# Patient Record
Sex: FEMALE | Race: WHITE | NOT HISPANIC OR LATINO
[De-identification: names, ages, dates, MRNs, and addresses within clinical notes are randomized per-mention and may not be internally consistent; named-entity substitution may affect disease eponyms.]

---

## 2020-05-20 ENCOUNTER — APPOINTMENT (OUTPATIENT)
Dept: ORTHOPEDIC SURGERY | Facility: CLINIC | Age: 55
End: 2020-05-20
Payer: COMMERCIAL

## 2020-05-20 VITALS
SYSTOLIC BLOOD PRESSURE: 134 MMHG | BODY MASS INDEX: 28.68 KG/M2 | DIASTOLIC BLOOD PRESSURE: 81 MMHG | TEMPERATURE: 98 F | HEART RATE: 108 BPM | WEIGHT: 168 LBS | HEIGHT: 64 IN

## 2020-05-20 DIAGNOSIS — M79.602 PAIN IN LEFT ARM: ICD-10-CM

## 2020-05-20 PROCEDURE — 73030 X-RAY EXAM OF SHOULDER: CPT | Mod: LT

## 2020-05-20 PROCEDURE — 99204 OFFICE O/P NEW MOD 45 MIN: CPT

## 2020-05-20 RX ORDER — PREDNISONE 50 MG/1
50 TABLET ORAL DAILY
Qty: 5 | Refills: 0 | Status: ACTIVE | COMMUNITY
Start: 2020-05-20 | End: 1900-01-01

## 2020-05-23 NOTE — HISTORY OF PRESENT ILLNESS
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  Pain is primarily located at the , anterior shoulder and lateral upper arm. It began in  beginning of 3/2020 without injury or trauma.  Pain is described as sharp/achy/pulling in nature, 6/10 in intensity, worse with increased range of motion, better with rest.  \par No Bruising/swelling\par NO prior injury\par Denies bowel/bladder changes, fevers, chills, saddle anesthesia. \par Reports having tingling on bilateral hands from her knuckles to her finger tips. This began after a recent hysterectomy.  \par \par

## 2020-05-23 NOTE — DISCUSSION/SUMMARY
[de-identified] : Ilda and I discussed her shoulder pain.  She has a very large calcific density overlying the biceps tendon, which is likely the main source of her pain.    I suspect that much of her arm pain originates from the inflammation around this area.  I provided her with a short course of prednisone to help decrease the acute inflammation around this density.  She can follow-up next week for a ultrasound-guided barbotage of the area.\par prednisone.\par \par Nneka Hicks MD, EdM\par Sports Medicine PM&R\par \par \par I, Marija Hagan ATC, assisted with the history and documentation for Dr. Hicks on this date 05/20/2020\par \par

## 2020-05-27 ENCOUNTER — APPOINTMENT (OUTPATIENT)
Dept: ORTHOPEDIC SURGERY | Facility: CLINIC | Age: 55
End: 2020-05-27
Payer: COMMERCIAL

## 2020-05-27 VITALS
HEART RATE: 96 BPM | HEIGHT: 64 IN | SYSTOLIC BLOOD PRESSURE: 115 MMHG | WEIGHT: 168 LBS | DIASTOLIC BLOOD PRESSURE: 77 MMHG | BODY MASS INDEX: 28.68 KG/M2 | TEMPERATURE: 98.9 F

## 2020-05-27 PROCEDURE — 99214 OFFICE O/P EST MOD 30 MIN: CPT | Mod: 25

## 2020-05-27 PROCEDURE — 76942 ECHO GUIDE FOR BIOPSY: CPT | Mod: LT

## 2020-05-27 PROCEDURE — 20551 NJX 1 TENDON ORIGIN/INSJ: CPT | Mod: LT

## 2020-05-27 RX ORDER — PREDNISONE 50 MG/1
50 TABLET ORAL DAILY
Qty: 5 | Refills: 0 | Status: ACTIVE | COMMUNITY
Start: 2020-05-27 | End: 1900-01-01

## 2020-05-27 RX ORDER — OXYCODONE 5 MG/1
5 TABLET ORAL
Qty: 20 | Refills: 0 | Status: ACTIVE | COMMUNITY
Start: 2020-05-27 | End: 1900-01-01

## 2020-06-08 ENCOUNTER — APPOINTMENT (OUTPATIENT)
Dept: MRI IMAGING | Facility: IMAGING CENTER | Age: 55
End: 2020-06-08
Payer: COMMERCIAL

## 2020-06-08 ENCOUNTER — OUTPATIENT (OUTPATIENT)
Dept: OUTPATIENT SERVICES | Facility: HOSPITAL | Age: 55
LOS: 1 days | End: 2020-06-08
Payer: COMMERCIAL

## 2020-06-08 DIAGNOSIS — M79.602 PAIN IN LEFT ARM: ICD-10-CM

## 2020-06-08 PROCEDURE — 73221 MRI JOINT UPR EXTREM W/O DYE: CPT | Mod: 26,LT

## 2020-06-08 PROCEDURE — 73221 MRI JOINT UPR EXTREM W/O DYE: CPT

## 2020-06-12 ENCOUNTER — APPOINTMENT (OUTPATIENT)
Dept: ORTHOPEDIC SURGERY | Facility: CLINIC | Age: 55
End: 2020-06-12
Payer: COMMERCIAL

## 2020-06-12 PROCEDURE — 99213 OFFICE O/P EST LOW 20 MIN: CPT

## 2020-06-13 NOTE — PHYSICAL EXAM
[de-identified] : Constitutional: Well-nourished, well-developed, No acute distress\par Respiratory: Good respiratory effort, no SOB\par Lymphatic: No regional lymphadenopathy, no lymphedema\par Psychiatric: Pleasant and normal affect, alert and oriented x3\par Skin: Clean dry and intact B/L UE/LE\par Musculoskeletal: normal except where as noted in regional exam\par \par left Shoulder:\par APPEARANCE: no marked deformities, no swelling or malalignment\par POSITIVE TENDERNES LH biceps, \par NON TENDERNESS: S: supraspinatus, infraspinatus, teres minor, anterior and posterior capsule, \par ROM: limited in all directions due to pain\par SPECIAL TESTS: + charity's; neg empty can; + hawkin's/heather; \par Vasc: 2+ radial pulse\par Neuro: AIN, PIN, Ulnar nerve intact to motor, DTRs 2+/4 biceps, triceps, brachioradialis\par Sensation: Intact to light touch throughout\par B/L Elbows: No asymmetry, malalignment, or swelling, Full ROM, 5/5 strength in flexion/ext, pronation/supination, Joints stable\par B/L Wrist and Hand: No asymmetry, malalignment, or swelling, Full ROM, 5/5 strength in wrist and long finger flexion/ext, radial/ulnar deviation, Joints stable\par

## 2020-06-13 NOTE — PROCEDURE
[de-identified] : PROCEDURE: left Shoulder Calcific Tendon Barbotage Under Ultrasound Guidance\par Procedure:	\par \par Pre-Procedure Diagnosis: Shoulder pain, left biceps/subscap calcific tendinopathy\par Anesthesia: Local\par \par Details of Procedure: \par The patient was evaluated under ultrasound using a 12 MHzlinear probe scanning. Patient placed in the supine position with neutral position of the left shoulder. \par Multiple real time-dynamic, gray-scale, transverse sonographic images of the biceps/subscapularis tendon were obtained, labeled, and archived. The region of calcific tendinopathy was identified.\par  \par The area of injection was marked, then prepped in usual sterile fashion with sterile alcohol. 5 mL of 1% lidocaine were injected for local anaesthetic effect. Then, using a no-touch technique, an 18 gauge, 1.5" needle with control syringe was introduced in plane with the transducer until the needle tip was visualized in the calcific deposit. The calcific deposit was flushed with lidocaine and then aspirated.  The needle was withdrawn without incident. \par Hemostasis was obtained. Blood loss minimal. Injection site was dressed with a band-aid.\par The patient tolerated the procedure well. \par \par Complications: None \par \par \par \par \par

## 2020-06-13 NOTE — PHYSICAL EXAM
[de-identified] : Constitutional: Well-nourished, well-developed, No acute distress\par Respiratory: Good respiratory effort, no SOB\par Lymphatic: No regional lymphadenopathy, no lymphedema\par Psychiatric: Pleasant and normal affect, alert and oriented x3\par Skin: Clean dry and intact B/L UE/LE\par Musculoskeletal: normal except where as noted in regional exam\par \par left Shoulder:\par APPEARANCE: no marked deformities, no swelling or malalignment\par POSITIVE TENDERNESS LH biceps, supraspinatus\par NON TENDERNESS, infraspinatus, teres minor, anterior and posterior capsule, \par ROM: limited in all directions due to pain\par SPECIAL TESTS: + santamaria's; neg empty can; + hawkin's/heather; \par Vasc: 2+ radial pulse\par Neuro: AIN, PIN, Ulnar nerve intact to motor, DTRs 2+/4 biceps, triceps, brachioradialis\par Sensation: Intact to light touch throughout\par B/L Elbows: No asymmetry, malalignment, or swelling, Full ROM, 5/5 strength in flexion/ext, pronation/supination, Joints stable\par B/L Wrist and Hand: No asymmetry, malalignment, or swelling, Full ROM, 5/5 strength in wrist and long finger flexion/ext, radial/ulnar deviation, Joints stable\par  [de-identified] : Patient comes to today's visit with outside imaging already performed.  I reviewed the images in detail with the patient and discussed the findings as highlighted below. \par \par MRI left shoulder\par \par PROCEDURE DATE:  06/08/2020\par \par \par \par INTERPRETATION:  CLINICAL INDICATION: Biceps pain. Calcific density at the shoulder with tenderness along the humerus just above the elbow.\par \par Multiplanar Multisequence MR of the left shoulder.\par \par Prior Studies: Correlation is made with radiographs from the orthopedics PACS dated May 20, 2020.\par \par FINDINGS:\par \par ROTATOR CUFF: There is moderate tendinosis of the supraspinatus and infraspinatus tendons. There is mild to moderate bursal surface tearing along the mid/posterior insertional fibers of the infraspinatus tendon measuring approximately 0.8 cm in anteroposterior posterior dimension and approximately 0.4 cm in transverse dimension. There is moderate tendinosis of the subscapularis tendon without discrete tear. There is intratendinous edema with foci of hypointense signal along the cranial/mid insertional fibers of the subscapularis tendon likely related to calcific tendinosis as this corresponds to the region of mineralization seen on the prior radiographs. Teres minor tendinous insertion is intact.\par \par MUSCLES: There is no fatty atrophy or edema within the rotator cuff musculature or deltoid muscle.\par \par LONG HEAD BICEPS TENDON: There is mild to moderate tendinosis of the intra-articular portion of the long head biceps tendon with a small focus of intrasubstance tearing at the midportion.\par \par GLENOHUMERAL JOINT: There is degeneration of the glenoid labrum with the posterior superior labrum appearing blunted and diminutive in appearance. The articular surfaces are intact. There is thickening and increased intrinsic signal within the inferior capsule structures with trace surrounding periligamentous edema suggestive of adhesive capsulitis.\par \par SYNOVIUM: There is a small joint effusion. There is edema within the subacromial/subdeltoid bursa consistent with bursitis.\par \par ACROMIOCLAVICULAR JOINT: There is mild arthrosis.\par \par MARROW: There is no acute fracture or osteonecrosis.\par \par NERVES: Intact.\par \par SUBCUTANEOUS TISSUES: Unremarkable.\par \par IMPRESSION:\par \par Findings consistent with calcific tendinosis along the cranial/mid insertional fibers of the subscapularis tendon. Subacromial/subdeltoid bursitis.\par \par Mild to moderate bursal surface tearing along the mid to posterior fibers of the infraspinatus tendon.\par \par Findings suggestive of adhesive capsulitis.\par \par Mild to moderate tendinosis of the intra-articular portion of the long head biceps tendon with a small focus of intrasubstance tearing.\par

## 2020-06-13 NOTE — DISCUSSION/SUMMARY
[de-identified] : \par Ilda presents for follow up of calcific tendinopathy.  The acute inflammation has resolved with prednisone.  I have attempted a barbotage of the calcific area today in clinic to prevent further flare ups.  Oxycodone prescribed for post procedure pain.  Follow up in 1 month.\par \par Nneka Hicks MD, EdM\par Sports Medicine PM&R\par \par \par I, Marija Hagan ATC, assisted with the history and documentation for Dr. Hicks on this date 05/27/2020\par \par

## 2020-06-13 NOTE — HISTORY OF PRESENT ILLNESS
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   After last visit  on 5/20 she was given a short course of prednisone. She reports feeling much better, she range of motion has increased and her sensitivity over the anterior left shoulder . Pain is a 4/10 in intensity. \par Denies bowel/bladder changes, fevers, chills, saddle anesthesia.  Denies numbness, tingling, weakness of the upper extremities.    \par \par

## 2020-06-13 NOTE — ADDENDUM
[FreeTextEntry1] : I, Carole Friedman ATC, assisted with the history and documentation for Dr. Hicks on this date 06/12/2020.\par

## 2020-06-13 NOTE — HISTORY OF PRESENT ILLNESS
[de-identified] : Ilda is a 54 year old RHD F who is here for MRI review of left shoulder pain. Pain is primarily located at the anterior shoulder. It began around February 2020, without injury or trauma. She reports she does not exercise and had no lifestyle changes. Her pain has slightly improved since she was last seen. She reports that she occasionally takes Advil to help her sleep at night. \par . Denies numbness, tingling, weakness of the upper extremities. \par \par

## 2020-06-13 NOTE — DISCUSSION/SUMMARY
[de-identified] : Ilda and I discussed her shoulder pain.  She has significant calcification over the subscapularis tendon and findings suggestive of adhesive capsulitis.  We discussed another possible barbotage of the area and possible intra articular cortisone injection to prevent adhesive capsulitis, if these are not effective, can consider surgery.  Patient would like to schedule procedure next week.  \par \par Follow up next week\par \par Nneka Hicks MD, EdM\par Sports Medicine PM&R\par

## 2020-07-20 ENCOUNTER — TRANSCRIPTION ENCOUNTER (OUTPATIENT)
Age: 55
End: 2020-07-20

## 2020-07-20 ENCOUNTER — APPOINTMENT (OUTPATIENT)
Dept: ORTHOPEDIC SURGERY | Facility: CLINIC | Age: 55
End: 2020-07-20
Payer: COMMERCIAL

## 2020-07-20 VITALS
DIASTOLIC BLOOD PRESSURE: 78 MMHG | HEIGHT: 64 IN | HEART RATE: 75 BPM | WEIGHT: 168 LBS | BODY MASS INDEX: 28.68 KG/M2 | SYSTOLIC BLOOD PRESSURE: 135 MMHG

## 2020-07-20 DIAGNOSIS — M75.02 ADHESIVE CAPSULITIS OF LEFT SHOULDER: ICD-10-CM

## 2020-07-20 DIAGNOSIS — M75.32 CALCIFIC TENDINITIS OF LEFT SHOULDER: ICD-10-CM

## 2020-07-20 PROCEDURE — 20611 DRAIN/INJ JOINT/BURSA W/US: CPT | Mod: LT

## 2020-07-20 PROCEDURE — 20551 NJX 1 TENDON ORIGIN/INSJ: CPT | Mod: 59

## 2020-07-20 PROCEDURE — 99214 OFFICE O/P EST MOD 30 MIN: CPT | Mod: 25

## 2020-08-31 ENCOUNTER — APPOINTMENT (OUTPATIENT)
Dept: ORTHOPEDIC SURGERY | Facility: CLINIC | Age: 55
End: 2020-08-31

## 2021-02-16 ENCOUNTER — APPOINTMENT (OUTPATIENT)
Dept: ORTHOPEDIC SURGERY | Facility: CLINIC | Age: 56
End: 2021-02-16
Payer: COMMERCIAL

## 2021-02-16 VITALS — TEMPERATURE: 97.4 F

## 2021-02-16 DIAGNOSIS — M25.551 PAIN IN RIGHT HIP: ICD-10-CM

## 2021-02-16 PROCEDURE — 99072 ADDL SUPL MATRL&STAF TM PHE: CPT

## 2021-02-16 PROCEDURE — 99203 OFFICE O/P NEW LOW 30 MIN: CPT

## 2021-02-16 PROCEDURE — 73501 X-RAY EXAM HIP UNI 1 VIEW: CPT | Mod: RT

## 2021-02-16 NOTE — HISTORY OF PRESENT ILLNESS
[de-identified] : 55 year old female presents today with right hip pain since November 2020. Pain started after walking 2-5 miles for exercise in November.  The pain is constant worse with standing after prolonged sitting and prolonged walking. Pain is localized to lateral hip and lower back. She is not taking a pain medication. Reports being to tender to touch occasionally. She is unable to sleep on that side due to the pain. Stretching is minimally helpful. Denies groin pain, radiation of pain, numbness or tingling. \par \par The patient's past medical history, past surgical history, medications and allergies were reviewed by me today with the patient and documented accordingly. In addition, the patient's family and social history, which were noncontributory to this visit, were reviewed also.

## 2021-02-16 NOTE — ADDENDUM
[FreeTextEntry1] : This note was written by Simi Ma on 02/16/2021 acting solely as a scribe for Dr. Gamal Callahan.\par \par All medical record entries made by the Scribe were at my, Dr. Gamal Callahan, direction and personally dictated by me on 02/16/2021. I have personally reviewed the chart and agree that the record accurately reflects my personal performance of the history, physical exam, assessment and plan.

## 2021-02-16 NOTE — DISCUSSION/SUMMARY
[de-identified] : 54 y/o female with right hip pain. \par \par The patient's presentation is consistent with peritrochanteric pain syndrome. Discussed with the patient the likely causes of the discomfort including greater trochanteric bursitis, iliotibial band irritation, hip abduction dysfunction/gluteal tendinopathy. There is no evidence of sciatic nerve irritation/radicular pain.Treatment for this syndrome typically includes conservative/nonoperative management including hip/low strengthening and stretching, NSAIDs, and physical therapy modalities with possible use of corticosteroid injection on an as-needed basis. The patient fails prolonged conservative management, operative intervention may be warranted.\par \par Recommendation: Begin trial of PT, Rx given. Conservative care & observation, this includes rest/activity avoidance until less symptomatic with subsequent gradual return to full activity as tolerated. Patient may also use OTC NSAID's or acetaminophen as tolerated, with application of ice to the area 2-3x daily for 20 minutes after periods of activity. \par \par F/u as needed.

## 2021-02-16 NOTE — PHYSICAL EXAM
[de-identified] : Oriented to time, place, person\par Mood: Normal\par Affect: Normal\par Appearance: Healthy, well appearing, no acute distress.\par Gait: Normal\par Assistive Devices: None\par \par Right Hip Exam:\par \par Skin: Clean, dry, intact\par Inspection: No obvious deformity, no swelling.\par Pulses: 2+ DP/PT pulses \par ROM:110 flexion. Internal rotation to 45. External rotation to 60. \par Painful ROM: None, No groin pain.\par Tenderness: No tenderness over greater trochanter. + tenderness at gluteal insertion. No paraspinal tenderness. No axial lumbar tenderness. No sacroiliac tenderness. No TTP over the ASIS/Iliac crest.\par Strength: 5/5 hip flexion, 5/5 gluteal strength, 5/5 hip ADD, 5/5 hip ABD, 5/5 Q/H, 5/5 TA/GS/EHL\par Neuro: Sensation intact to light touch throughout, DTR normal\par Additional tests: Negative impingement test, Negative LORE  [de-identified] : The following radiographs were ordered and read by me during this patients visit. I reviewed each radiograph in detail with the patient and discussed the findings as highlighted below. \par \par AP pelvis and lateral view of the right hip were obtained today, 02/16/2021, that show no acute bony injury, including fracture or dislocation. There is no hip degenerative change seen. There is no gross pelvic of hip malalignment. No significant other obvious osseous abnormality, otherwise unremarkable. \par \par MRI of left shoulder 6.8.2020 shows findings consistent with calcific tendinosis along the cranial/mid insertional fibers of the subscapularis tendon. Subacromial/subdeltoid bursitis.

## 2021-03-09 ENCOUNTER — OUTPATIENT (OUTPATIENT)
Dept: OUTPATIENT SERVICES | Facility: HOSPITAL | Age: 56
LOS: 1 days | End: 2021-03-09
Payer: COMMERCIAL

## 2021-03-09 ENCOUNTER — APPOINTMENT (OUTPATIENT)
Dept: CT IMAGING | Facility: IMAGING CENTER | Age: 56
End: 2021-03-09
Payer: COMMERCIAL

## 2021-03-09 DIAGNOSIS — J32.0 CHRONIC MAXILLARY SINUSITIS: ICD-10-CM

## 2021-03-09 PROCEDURE — 70486 CT MAXILLOFACIAL W/O DYE: CPT

## 2021-03-09 PROCEDURE — 70486 CT MAXILLOFACIAL W/O DYE: CPT | Mod: 26

## 2021-10-13 ENCOUNTER — APPOINTMENT (OUTPATIENT)
Dept: ORTHOPEDIC SURGERY | Facility: CLINIC | Age: 56
End: 2021-10-13
Payer: COMMERCIAL

## 2021-10-13 VITALS — HEIGHT: 64 IN | BODY MASS INDEX: 29.02 KG/M2 | WEIGHT: 170 LBS

## 2021-10-13 PROCEDURE — 99214 OFFICE O/P EST MOD 30 MIN: CPT | Mod: 25

## 2021-10-13 PROCEDURE — 73030 X-RAY EXAM OF SHOULDER: CPT | Mod: RT

## 2021-10-13 PROCEDURE — 20550 NJX 1 TENDON SHEATH/LIGAMENT: CPT | Mod: RT

## 2021-10-13 PROCEDURE — 76942 ECHO GUIDE FOR BIOPSY: CPT

## 2021-10-17 ENCOUNTER — OUTPATIENT (OUTPATIENT)
Dept: OUTPATIENT SERVICES | Facility: HOSPITAL | Age: 56
LOS: 1 days | End: 2021-10-17
Payer: COMMERCIAL

## 2021-10-17 ENCOUNTER — APPOINTMENT (OUTPATIENT)
Dept: MRI IMAGING | Facility: IMAGING CENTER | Age: 56
End: 2021-10-17
Payer: COMMERCIAL

## 2021-10-17 DIAGNOSIS — Z00.8 ENCOUNTER FOR OTHER GENERAL EXAMINATION: ICD-10-CM

## 2021-10-17 DIAGNOSIS — M25.511 PAIN IN RIGHT SHOULDER: ICD-10-CM

## 2021-10-17 PROCEDURE — 73221 MRI JOINT UPR EXTREM W/O DYE: CPT | Mod: 26,RT

## 2021-10-17 PROCEDURE — 73221 MRI JOINT UPR EXTREM W/O DYE: CPT

## 2021-10-21 ENCOUNTER — APPOINTMENT (OUTPATIENT)
Dept: ORTHOPEDIC SURGERY | Facility: CLINIC | Age: 56
End: 2021-10-21
Payer: COMMERCIAL

## 2021-10-21 VITALS
SYSTOLIC BLOOD PRESSURE: 128 MMHG | BODY MASS INDEX: 29.02 KG/M2 | HEIGHT: 64 IN | HEART RATE: 64 BPM | WEIGHT: 170 LBS | DIASTOLIC BLOOD PRESSURE: 76 MMHG

## 2021-10-21 PROCEDURE — 20611 DRAIN/INJ JOINT/BURSA W/US: CPT | Mod: RT

## 2021-10-21 PROCEDURE — 99214 OFFICE O/P EST MOD 30 MIN: CPT | Mod: 25

## 2021-10-21 NOTE — HISTORY OF PRESENT ILLNESS
[de-identified] : TY SINGH is a 55 year old F who presents for follow up visit with right shoulder pain. Pain is primarily located at the anterolateral shoulder and into proximal 1/3 of anterior arm. Patient was previously seen 10/13/21 and had a cortisone injection of biceps tendon sheath. She had worsening pain for 2 days after the injection, then ayaz improved. Yesterday, she was able to flex her shoulder higher than before. She occasionally takes Ibuprofen which temporarily alleviates pain.\par Denies numbness, tingling, weakness of the upper extremities.

## 2021-10-21 NOTE — DISCUSSION/SUMMARY
[de-identified] : Discussed MRI of right shoulder results with patient, including multiple shoulder pathologies that likely cause her pain and limited ROM. Patient had cortisone injection into long head of biceps tendon sheath at last visit with some improvement in pain. Due to her labral pathology, recommend right shoulder intraarticular injection. Patient understands and agrees with plan. She tolerated procedure well without complications. Recommend icing shoulder after injection and continuing with PT and ROM exercises. \par \par Nneka Hicks MD, EdM\par Sports Medicine PM&R\par Department of Orthopedics \par \par Desi LEE DO, PGY3, assisted with the history and documentation for Dr. Hicks on this date 10/21/2021.\par

## 2021-10-21 NOTE — PHYSICAL EXAM
[de-identified] : Constitutional: Well-nourished, well-developed, No acute distress\par Respiratory: Good respiratory effort, no SOB\par Lymphatic: No regional lymphadenopathy, no lymphedema\par Psychiatric: Pleasant and normal affect, alert and oriented x3\par Skin: Clean dry and intact B/L UE/LE\par Musculoskeletal: normal except where as noted in regional exam\par \par left Shoulder:\par APPEARANCE: no marked deformities, no swelling or malalignment\par POSITIVE TENDERNESS LH biceps\par NON TENDERNESS: S: supraspinatus, infraspinatus, teres minor, anterior and posterior capsule, AC joint\par ROM: limited in all directions due to pain, but able to flex to 160 degrees and abduct to 60 degrees actively\par SPECIAL TESTS: + empty can; + hawkin's/heather, + speed's, +scarf\par Vasc: 2+ radial pulse\par Neuro: AIN, PIN, Ulnar nerve intact to motor, DTRs 2+/4 biceps, triceps, brachioradialis\par Sensation: Intact to light touch throughout\par B/L Elbows: No asymmetry, malalignment, or swelling, Full ROM, 5/5 strength in flexion/ext, pronation/supination, Joints stable\par B/L Wrist and Hand: No asymmetry, malalignment, or swelling, Full ROM, 5/5 strength in wrist and long finger flexion/ext, radial/ulnar deviation, Joints stable  [de-identified] : Patient comes to today's visit with outside imaging already performed.  I reviewed the images in detail with the patient and discussed the findings as highlighted below. \par \par \par   MR Shoulder Joint No Cont, Right             Final\par \par No Documents Attached\par \par \par \par \par   EXAM:  MR SHOULDER RT\par \par \par PROCEDURE DATE:  10/17/2021\par \par \par \par INTERPRETATION:  RIGHT SHOULDER MRI\par \par CLINICAL INFORMATION: Right shoulder and arm pain with limited range of motion for 2 months.\par TECHNIQUE: Multiplanar, multisequence MR imaging was obtained of the right shoulder.\par COMPARISON: Radiograph right shoulder 10/13/2021 on ortho PACS\par \par FINDINGS:\par \par ROTATOR CUFF: There is moderate tendinosis of the anterior to mid fibers of the supraspinatus tendon with moderate intrasubstance tearing. No full-thickness or retracted tear is demonstrated. The infraspinatus, subscapularis, and teres minor tendons are intact.\par BICEPS TENDON: There is moderate to severe tendinosis of the distal intra-articular portion of the long head biceps tendon.\par AC JOINT: There is mild to moderate arthrosis. There is a small lateral subacromial enthesophyte.\par GLENOID LABRUM AND GLENOHUMERAL LIGAMENTS: There is diffuse degeneration of the glenoid labrum. The superior to posterior superior labrum is markedly blunted and diminutive in appearance consistent with the sequela of high-grade tearing.\par GLENOHUMERAL CARTILAGE AND SUBCHONDRAL BONE: There is posterior subluxation of the humerus relative to the glenoid. There is chondral loss at the posterior superior aspect of the glenoid fossa. There is moderate chondral thinning along the medial aspect of the humeral head.\par SYNOVIUM/JOINT FLUID: There is a small glenohumeral joint effusion with synovial thickening consistent with synovitis. Fluid decompresses into the extra articular biceps tendon sheath. There is moderate fluid within the subacromial/subdeltoid bursa consistent with bursitis.\par BONE MARROW: No acute fractures or bone marrow edema.\par MUSCLES: No atrophy or fatty infiltration.\par NEUROVASCULAR STRUCTURES: Unremarkable.\par SUBCUTANEOUS SOFT TISSUES: No soft tissue edema or fluid collections.\par \par IMPRESSION:\par Moderate tendinosis of the anterior to mid fibers of the supraspinatus tendon with moderate intrasubstance tearing.\par \par Moderate to severe tendinosis of the distal intradural portion of long head biceps tendon.\par \par Mild glenohumeral arthrosis. The superior to posterior superior labrum is markedly blunted and diminutive in appearance consistent with the sequela of high-grade tearing.\par \par Small glenohumeral joint effusion with synovitis. Mild subacromial/subdeltoid bursitis.\par \par --- End of Report ---\par

## 2021-10-21 NOTE — PROCEDURE
[de-identified] : Ultrasound Guided Injection \par Indication for ultrasound guidance: Ensure placement within the shoulder joint\par \par Utlizing the [employer] portable ultrasound machine, the Linear 6cm 13-6 MHz transducer and sterile ultrasound gel, ultrasound guidance with the probe in the long axis, utilizing an in plane approach, was used for the following injection:\par \par Indication: right shoulder labral tear\par \par A discussion was had with the patient regarding this procedure and all questions were answered. All risks, benefits and alternatives were discussed. These included but were not limited to bleeding, infection, allergic reaction and reaccumulation of fluid. A timeout was done to ensure correct side and pt agreed to the procedure. Chlorhexidine was used to sterilize and prep the area in the posterior aspect of the knee..A 25-gauge needle was used to injection 3mL of 1% lidocaine without epinephrine. An 18-gauge needle was then used to aspirate the knee joint and approximately 15 cc of yellow fluid was aspirated from the cyst without complication, the same needle was used to inject 4cc of 1% lidocaine without epinephrine and 1cc of 40mg/ml methylprednisolone into the knee. A sterile bandage was then applied. The patient tolerated the procedure well.\par

## 2021-10-27 ENCOUNTER — APPOINTMENT (OUTPATIENT)
Dept: ORTHOPEDIC SURGERY | Facility: CLINIC | Age: 56
End: 2021-10-27
Payer: COMMERCIAL

## 2021-10-27 VITALS
WEIGHT: 170 LBS | SYSTOLIC BLOOD PRESSURE: 100 MMHG | BODY MASS INDEX: 29.02 KG/M2 | DIASTOLIC BLOOD PRESSURE: 66 MMHG | HEART RATE: 76 BPM | HEIGHT: 64 IN

## 2021-10-27 DIAGNOSIS — M72.2 PLANTAR FASCIAL FIBROMATOSIS: ICD-10-CM

## 2021-10-27 DIAGNOSIS — M76.60 ACHILLES TENDINITIS, UNSPECIFIED LEG: ICD-10-CM

## 2021-10-27 PROCEDURE — 99214 OFFICE O/P EST MOD 30 MIN: CPT

## 2021-10-27 PROCEDURE — 73610 X-RAY EXAM OF ANKLE: CPT | Mod: LT

## 2021-10-31 NOTE — DISCUSSION/SUMMARY
[de-identified] : Discussed bilateral heel pain with patient. XR of bilateral ankles reviewed. Pain at bilateral heels is likely enthesophyte at Achilles tendon attachment at bilateral calcaneus, left worse than right. Recommend PT for foot and ankle stretches and given information for ultrastretch night sock to be used when sleeping to stretch plantar fascia and Achilles tendon during the night. Also recommended putting padding on back of shoes. Patient understands and agrees with plan. \par \par Desi LEE DO, PGY3, assisted with the history and documentation for Dr. Hicks on this date 10/27/2021.\par \par Nneka Hicks MD, EdM\par Sports Medicine PM&R\par Department of Orthopedics \par

## 2021-10-31 NOTE — HISTORY OF PRESENT ILLNESS
[de-identified] : TY is a 55 year F who presents with bilateral foot pain. Pain is primarily located at the bilateral heel pain. It began a few months ago, without injury or trauma. Pain is described as sharp in nature, 5/10 in intensity, worse with standing after prolonged resting, better with rest. The pain resolves after taking a few steps. No prior injury but has history of heel spurs bilaterally for years. Current pain is different from heel spur pain. \par Denies bowel/bladder changes, fevers, chills, saddle anesthesia. Denies numbness, tingling, weakness of the lower extremities.

## 2021-10-31 NOTE — HISTORY OF PRESENT ILLNESS
[de-identified] : TY is a 55 year F who presents with bilateral foot pain. Pain is primarily located at the bilateral heel pain. It began a few months ago, without injury or trauma. Pain is described as sharp in nature, 5/10 in intensity, worse with standing after prolonged resting, better with rest. The pain resolves after taking a few steps. No prior injury but has history of heel spurs bilaterally for years. Current pain is different from heel spur pain. \par Denies bowel/bladder changes, fevers, chills, saddle anesthesia. Denies numbness, tingling, weakness of the lower extremities.

## 2021-10-31 NOTE — PHYSICAL EXAM
[de-identified] : FOOT EVAL: \par Constitutional: Well-nourished, well-developed, No acute distress \par Respiratory: Good respiratory effort, no SOB \par Lymphatic: No regional lymphadenopathy, no lymphedema \par Psychiatric: Pleasant and normal affect, alert and oriented x3 \par Skin: Clean dry and intact B/L UE/LE \par Musculoskeletal: normal except where as noted in regional exam \par \par Bilateral Feet:\par APPEARANCE: no marked deformities, no swelling or malalignment \par POSITIVE TENDERNESS: mild tenderness 2-3cm proximal to Achilles tendon attachment on calcaneus on the right, bilateral plantar surface of calcaneus\par NONTENDER: 5th metatarsal base, cuboid, 1st MTP\par ROM: normal throughout foot, ankle, and digits. \par RESISTIVE TESTING: painless flex/ext, abd/add of all digits. \par SPECIAL TESTS: neg anterior drawer, talar tilt\par NEURO: Normal sensation of LE, DTRs 2+/4 patella and achilles \par PULSES: 2+ DP/PT pulses \par B/L Ankles: No asymmetry, malalignment, or swelling, Full ROM, 5/5 strength in DF/PF/Inv/Ev, Joints stable [de-identified] : \par The following radiographs were ordered and read by me during this patient's visit. I reviewed each radiograph in detail with the patient and discussed the findings as highlighted below. \par \par 3 views of the bilateral  ankles were obtained today that show no fracture, or dislocation. There are calcaneal spurs bilaterally

## 2021-10-31 NOTE — DISCUSSION/SUMMARY
[de-identified] : Discussed bilateral heel pain with patient. XR of bilateral ankles reviewed. Pain at bilateral heels is likely enthesophyte at Achilles tendon attachment at bilateral calcaneus, left worse than right. Recommend PT for foot and ankle stretches and given information for ultrastretch night sock to be used when sleeping to stretch plantar fascia and Achilles tendon during the night. Also recommended putting padding on back of shoes. Patient understands and agrees with plan. \par \par Desi LEE DO, PGY3, assisted with the history and documentation for Dr. Hicks on this date 10/27/2021.\par \par Nneka Hicks MD, EdM\par Sports Medicine PM&R\par Department of Orthopedics \par

## 2021-10-31 NOTE — PHYSICAL EXAM
[de-identified] : FOOT EVAL: \par Constitutional: Well-nourished, well-developed, No acute distress \par Respiratory: Good respiratory effort, no SOB \par Lymphatic: No regional lymphadenopathy, no lymphedema \par Psychiatric: Pleasant and normal affect, alert and oriented x3 \par Skin: Clean dry and intact B/L UE/LE \par Musculoskeletal: normal except where as noted in regional exam \par \par Bilateral Feet:\par APPEARANCE: no marked deformities, no swelling or malalignment \par POSITIVE TENDERNESS: mild tenderness 2-3cm proximal to Achilles tendon attachment on calcaneus on the right, bilateral plantar surface of calcaneus\par NONTENDER: 5th metatarsal base, cuboid, 1st MTP\par ROM: normal throughout foot, ankle, and digits. \par RESISTIVE TESTING: painless flex/ext, abd/add of all digits. \par SPECIAL TESTS: neg anterior drawer, talar tilt\par NEURO: Normal sensation of LE, DTRs 2+/4 patella and achilles \par PULSES: 2+ DP/PT pulses \par B/L Ankles: No asymmetry, malalignment, or swelling, Full ROM, 5/5 strength in DF/PF/Inv/Ev, Joints stable [de-identified] : \par The following radiographs were ordered and read by me during this patient's visit. I reviewed each radiograph in detail with the patient and discussed the findings as highlighted below. \par \par 3 views of the bilateral  ankles were obtained today that show no fracture, or dislocation. There are calcaneal spurs bilaterally

## 2021-12-16 ENCOUNTER — APPOINTMENT (OUTPATIENT)
Dept: ORTHOPEDIC SURGERY | Facility: CLINIC | Age: 56
End: 2021-12-16

## 2022-02-23 ENCOUNTER — APPOINTMENT (OUTPATIENT)
Dept: ORTHOPEDIC SURGERY | Facility: CLINIC | Age: 57
End: 2022-02-23
Payer: COMMERCIAL

## 2022-02-23 DIAGNOSIS — S92.919A UNSPECIFIED FRACTURE OF UNSPECIFIED TOE(S), INITIAL ENCOUNTER FOR CLOSED FRACTURE: ICD-10-CM

## 2022-02-23 DIAGNOSIS — S92.532K: ICD-10-CM

## 2022-02-23 PROCEDURE — 73630 X-RAY EXAM OF FOOT: CPT | Mod: RT

## 2022-02-23 PROCEDURE — 99214 OFFICE O/P EST MOD 30 MIN: CPT

## 2022-02-26 PROBLEM — S92.532K: Status: ACTIVE | Noted: 2022-02-26

## 2022-02-26 PROBLEM — S92.919A TOE FRACTURE: Status: ACTIVE | Noted: 2022-02-26

## 2022-09-14 ENCOUNTER — APPOINTMENT (OUTPATIENT)
Dept: ORTHOPEDIC SURGERY | Facility: CLINIC | Age: 57
End: 2022-09-14

## 2022-09-14 PROCEDURE — 73564 X-RAY EXAM KNEE 4 OR MORE: CPT | Mod: RT

## 2022-09-14 PROCEDURE — 99214 OFFICE O/P EST MOD 30 MIN: CPT

## 2022-09-22 ENCOUNTER — APPOINTMENT (OUTPATIENT)
Dept: MRI IMAGING | Facility: IMAGING CENTER | Age: 57
End: 2022-09-22

## 2022-09-22 ENCOUNTER — OUTPATIENT (OUTPATIENT)
Dept: OUTPATIENT SERVICES | Facility: HOSPITAL | Age: 57
LOS: 1 days | End: 2022-09-22
Payer: COMMERCIAL

## 2022-09-22 DIAGNOSIS — Z00.8 ENCOUNTER FOR OTHER GENERAL EXAMINATION: ICD-10-CM

## 2022-09-22 DIAGNOSIS — Z00.00 ENCOUNTER FOR GENERAL ADULT MEDICAL EXAMINATION WITHOUT ABNORMAL FINDINGS: ICD-10-CM

## 2022-09-22 PROCEDURE — 73721 MRI JNT OF LWR EXTRE W/O DYE: CPT | Mod: 26,RT

## 2022-09-22 PROCEDURE — 73721 MRI JNT OF LWR EXTRE W/O DYE: CPT

## 2022-10-04 ENCOUNTER — APPOINTMENT (OUTPATIENT)
Dept: ORTHOPEDIC SURGERY | Facility: CLINIC | Age: 57
End: 2022-10-04

## 2022-10-04 VITALS
SYSTOLIC BLOOD PRESSURE: 134 MMHG | WEIGHT: 170 LBS | BODY MASS INDEX: 29.02 KG/M2 | DIASTOLIC BLOOD PRESSURE: 84 MMHG | HEIGHT: 64 IN

## 2022-10-04 DIAGNOSIS — S83.419A SPRAIN OF MEDIAL COLLATERAL LIGAMENT OF UNSPECIFIED KNEE, INITIAL ENCOUNTER: ICD-10-CM

## 2022-10-04 PROCEDURE — 99214 OFFICE O/P EST MOD 30 MIN: CPT

## 2022-10-04 NOTE — HISTORY OF PRESENT ILLNESS
[de-identified] : TY SINGH  is a 56 year old F  who presents for follow up visit for right knee pain. Patient was last seen on 9/14/22 where we ordered an MRI of the right knee. The patient is here today to review the MRI of the knee. She states that her symptoms have improved a little since the last visit. She has been compliant with wearing the knee brace dispensed at the last visit. She reports that she bought a knee sleeve which does not help as much. Today her pain scale is reported as 5-6/10. Pain is primarily located at the medial knee. \par The right knee pain began 9/10/22 when she flexed her knee up while inside a tubular slide and it got caught, causing it to hyperflex then twist. Since that time she has had significant pain with weight bearing, especially with any change in direction. She had some swelling at the time, which has been improving. \par No prior injury. Denies numbness, tingling, weakness of the lower extremities.

## 2022-10-04 NOTE — DISCUSSION/SUMMARY
[de-identified] : Ilda and I discussed her imaging.  There is evidence of OA and MCL sprain.  PT prescription provided.  Follow up in 6 weeks.\par \par Nneka Hicks MD, EdM\par [facility]\par Department of Orthopedics

## 2022-10-04 NOTE — PHYSICAL EXAM
[de-identified] : Constitutional: Well-nourished, well-developed, No acute distress\par Respiratory:  Good respiratory effort, no SOB\par Lymphatic: No regional lymphadenopathy, no lymphedema\par Psychiatric: Pleasant and normal affect, alert and oriented x3\par Skin: Clean dry and intact B/L UE/LE\par Musculoskeletal: normal except where as noted in regional exam\par \par Right Knee:\par APPEARANCE: no marked deformities, pos swelling or malalignment\par POSITIVE TENDERNESS:  medial joint line, MCL\par ROM: , anterior knee pain with flexion\par SPECIAL TESTS: laxity with valgus stress. painless grind. neg Lachman's. neg ant/post drawer. pos Carol's. \par NEURO: Normal sensation of LE, DTRs 2+/4 patella and achilles\par PULSES: 2+ DP/PT pulses\par B/L Hips: No asymmetry, malalignment, or swelling, Full ROM, 5/5 strength in flexion/ext, IR/ER, Abd/Add, Joints stable\par B/L Ankles: No asymmetry, malalignment, or swelling, Full ROM, 5/5 strength in DF/PF/Inv/Ev, Joints stable  [de-identified] : Patient comes to today's visit with outside imaging already performed.  I reviewed the images in detail with the patient and discussed the findings as highlighted below. \par \par \par   MR Knee No Cont, Right             Final\par \par No Documents Attached\par \par \par \par \par   EXAM: [id number] - MR KNEE RT  - ORDERED BY: NOEL LAZARO\par \par \par PROCEDURE DATE:  09/22/2022\par \par \par \par INTERPRETATION:  MR KNEE RIGHT\par \par CLINICAL INDICATION: Knee pain\par \par TECHNIQUE: Multiplanar, multisequence MRI was obtained of the right knee.\par \par COMPARISON: None available.\par \par FINDINGS:\par \par SYNOVIUM/ JOINT FLUID: There is trace fluid in the knee joint space. No popliteal cyst is seen.\par EXTENSOR MECHANISM: Mild enthesophyte formation at the superior patella. Intact extensor mechanism.\par CRUCIATE AND COLLATERAL LIGAMENTS: The ACL and PCL are preserved. The components of lateral collateral preserved. There is a thickened and irregular appearance of the proximal MCL suggesting a mild to moderate sprain.\par PATELLOFEMORAL COMPARTMENT: Moderate thinning of the lateral patellar facet cartilage with deep chondral fissuring. Deep chondral fissure within the trochlea with subchondral marrow edema. Surface fraying and moderate fissuring of the cartilage at the central medial patellar facet.\par MEDIAL COMPARTMENT: Intact medial meniscus. Deep chondral fissure at the central weightbearing medial femoral condyle with subchondral marrow edema.\par LATERAL COMPARTMENT: Intact lateral meniscus. A deep chondral fissure is noted at the posterior nonweightbearing aspect of lateral femoral condyle with subjacent marrow edema.\par BONE MARROW: No fracture. No osteonecrosis.\par MUSCLES: Preserved\par NEUROVASCULAR STRUCTURES: Preserved\par SUBCUTANEOUS SOFT TISSUES: Medial knee soft tissue swelling is seen.\par \par IMPRESSION:\par Moderate grade sprain of the proximal MCL with adjacent edema.\par Tricompartmental osteoarthritis.\par

## 2022-10-04 NOTE — ADDENDUM
[FreeTextEntry1] : I Jewel Luo ATC, assisted in the history and documentation of the patient with Dr Nneka Hicks on October 4th 2022.

## 2022-11-15 ENCOUNTER — APPOINTMENT (OUTPATIENT)
Dept: ORTHOPEDIC SURGERY | Facility: CLINIC | Age: 57
End: 2022-11-15

## 2024-02-26 ENCOUNTER — APPOINTMENT (OUTPATIENT)
Dept: ORTHOPEDIC SURGERY | Facility: CLINIC | Age: 59
End: 2024-02-26
Payer: COMMERCIAL

## 2024-02-26 VITALS
WEIGHT: 175 LBS | HEART RATE: 105 BPM | DIASTOLIC BLOOD PRESSURE: 74 MMHG | HEIGHT: 63 IN | BODY MASS INDEX: 31.01 KG/M2 | SYSTOLIC BLOOD PRESSURE: 113 MMHG

## 2024-02-26 PROCEDURE — 73030 X-RAY EXAM OF SHOULDER: CPT | Mod: RT

## 2024-02-26 PROCEDURE — 99213 OFFICE O/P EST LOW 20 MIN: CPT | Mod: 25

## 2024-02-26 PROCEDURE — 20611 DRAIN/INJ JOINT/BURSA W/US: CPT | Mod: RT

## 2024-03-04 ENCOUNTER — APPOINTMENT (OUTPATIENT)
Dept: ORTHOPEDIC SURGERY | Facility: CLINIC | Age: 59
End: 2024-03-04
Payer: COMMERCIAL

## 2024-03-04 VITALS — HEART RATE: 82 BPM | SYSTOLIC BLOOD PRESSURE: 111 MMHG | DIASTOLIC BLOOD PRESSURE: 76 MMHG

## 2024-03-04 VITALS — HEIGHT: 63 IN | WEIGHT: 178 LBS | BODY MASS INDEX: 31.54 KG/M2

## 2024-03-04 PROCEDURE — 20611 DRAIN/INJ JOINT/BURSA W/US: CPT | Mod: RT

## 2024-03-04 PROCEDURE — 72190 X-RAY EXAM OF PELVIS: CPT | Mod: RT

## 2024-03-04 PROCEDURE — 99213 OFFICE O/P EST LOW 20 MIN: CPT | Mod: 25

## 2024-03-04 PROCEDURE — 73502 X-RAY EXAM HIP UNI 2-3 VIEWS: CPT | Mod: RT

## 2024-03-04 RX ORDER — OMALIZUMAB 300 MG/2ML
300 INJECTION, SOLUTION SUBCUTANEOUS
Refills: 0 | Status: ACTIVE | COMMUNITY

## 2024-03-04 RX ORDER — PREDNISONE 50 MG/1
50 TABLET ORAL DAILY
Qty: 5 | Refills: 0 | Status: ACTIVE | COMMUNITY
Start: 2024-03-04 | End: 1900-01-01

## 2024-04-09 ENCOUNTER — APPOINTMENT (OUTPATIENT)
Dept: ORTHOPEDIC SURGERY | Facility: CLINIC | Age: 59
End: 2024-04-09
Payer: COMMERCIAL

## 2024-04-09 VITALS
HEIGHT: 63 IN | HEART RATE: 90 BPM | SYSTOLIC BLOOD PRESSURE: 128 MMHG | OXYGEN SATURATION: 95 % | BODY MASS INDEX: 31.01 KG/M2 | DIASTOLIC BLOOD PRESSURE: 78 MMHG | WEIGHT: 175 LBS

## 2024-04-09 DIAGNOSIS — M25.511 PAIN IN RIGHT SHOULDER: ICD-10-CM

## 2024-04-09 PROCEDURE — 99214 OFFICE O/P EST MOD 30 MIN: CPT

## 2024-04-09 RX ORDER — METHYLPREDNISOLONE 4 MG/1
4 TABLET ORAL
Qty: 1 | Refills: 1 | Status: ACTIVE | COMMUNITY
Start: 2024-04-09 | End: 1900-01-01

## 2025-04-21 ENCOUNTER — APPOINTMENT (OUTPATIENT)
Dept: INTERNAL MEDICINE | Facility: CLINIC | Age: 60
End: 2025-04-21

## 2025-07-14 ENCOUNTER — NON-APPOINTMENT (OUTPATIENT)
Age: 60
End: 2025-07-14

## 2025-07-14 ENCOUNTER — APPOINTMENT (OUTPATIENT)
Dept: ORTHOPEDIC SURGERY | Facility: CLINIC | Age: 60
End: 2025-07-14
Payer: COMMERCIAL

## 2025-07-14 VITALS — WEIGHT: 175 LBS | BODY MASS INDEX: 31.01 KG/M2 | HEIGHT: 63 IN

## 2025-07-14 PROCEDURE — 20610 DRAIN/INJ JOINT/BURSA W/O US: CPT | Mod: RT

## 2025-07-14 PROCEDURE — 99203 OFFICE O/P NEW LOW 30 MIN: CPT | Mod: 25

## 2025-07-14 PROCEDURE — 73030 X-RAY EXAM OF SHOULDER: CPT | Mod: RT
